# Patient Record
Sex: MALE | Race: WHITE | NOT HISPANIC OR LATINO | Employment: UNEMPLOYED | URBAN - METROPOLITAN AREA
[De-identification: names, ages, dates, MRNs, and addresses within clinical notes are randomized per-mention and may not be internally consistent; named-entity substitution may affect disease eponyms.]

---

## 2017-09-16 ENCOUNTER — HOSPITAL ENCOUNTER (EMERGENCY)
Facility: HOSPITAL | Age: 32
Discharge: HOME | End: 2017-09-16
Attending: EMERGENCY MEDICINE

## 2017-09-16 VITALS
OXYGEN SATURATION: 97 % | RESPIRATION RATE: 14 BRPM | TEMPERATURE: 99 F | HEIGHT: 72 IN | BODY MASS INDEX: 21.67 KG/M2 | DIASTOLIC BLOOD PRESSURE: 65 MMHG | WEIGHT: 160 LBS | HEART RATE: 90 BPM | SYSTOLIC BLOOD PRESSURE: 113 MMHG

## 2017-09-16 DIAGNOSIS — R20.0 NUMBNESS: Primary | ICD-10-CM

## 2017-09-16 DIAGNOSIS — S06.9X9S TBI (TRAUMATIC BRAIN INJURY), WITH LOC OF UNSPECIFIED DURATION, SEQUELA: ICD-10-CM

## 2017-09-16 DIAGNOSIS — R42 DIZZINESS: ICD-10-CM

## 2017-09-16 PROCEDURE — 93005 ELECTROCARDIOGRAM TRACING: CPT

## 2017-09-16 PROCEDURE — 99284 EMERGENCY DEPT VISIT MOD MDM: CPT

## 2017-09-16 PROCEDURE — 93010 ELECTROCARDIOGRAM REPORT: CPT | Mod: ,,, | Performed by: INTERNAL MEDICINE

## 2017-09-16 RX ORDER — MECLIZINE HYDROCHLORIDE CHEWABLE TABLETS 25 MG/1
32 TABLET, CHEWABLE ORAL 3 TIMES DAILY PRN
COMMUNITY

## 2017-09-16 RX ORDER — BUTALBITAL, ACETAMINOPHEN AND CAFFEINE 50; 325; 40 MG/1; MG/1; MG/1
1 TABLET ORAL EVERY 6 HOURS PRN
Qty: 20 TABLET | Refills: 0 | Status: SHIPPED | OUTPATIENT
Start: 2017-09-16 | End: 2017-10-16

## 2017-09-16 NOTE — ED TRIAGE NOTES
Pt had a fall from 15ft approx 1 month ago. Seen at [facility] in [place] with multiple facial fractures, left wrist fracture, and head bleed. Pt states was discharged from hospital 3 weeks ago and was feeling fine and getting better. For the last week, pt c/o worsening right sided facial numbness, right sided tongue numbess, and right sided upper and lower extremitity numbness. Denies any neuro deficits. Denies any other complaints

## 2017-09-16 NOTE — ED PROVIDER NOTES
"Encounter Date: 9/16/2017    SCRIBE #1 NOTE: I, Sonido Salas MIRANDA, am scribing for, and in the presence of,  Mike Davis MD. I have scribed the following portions of the note - Other sections scribed: HPI and ROS.       History     Chief Complaint   Patient presents with    Shortness of Breath     States he was in an accident falling from a ladder a month ago and was fine till a week ago when he started getting dizzy, sob, HA    Dizziness     CC: Numbness     HPI: This 32 y.o. male with MRSA infection and abscess  presents to the ED c/o acute onset, mild (2/10), numbness to the right side of his body with fatigue, ear pain, sinus pressure, SOB, and jaw pain. Pt reports 1.5 months ago he suffered multiple fractures including left wrist and right sided facial fractures along with a brain bleed post 15 foot ladder fall. 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 Pt states he was hospitalized at [facility] and returned to work three days after the incident. Pt states he felt "fine" until this past week when he began to feel " lethargic and drained all the time." No prior tx attempted. No alleviating factors. Pt denies nausea, weakness, and tingling.       The history is provided by the patient. No  was used.     Review of patient's allergies indicates:  No Known Allergies  Past Medical History:   Diagnosis Date    Abscess     MRSA infection      Past Surgical History:   Procedure Laterality Date    ABCESS DRAINAGE       History reviewed. No pertinent family history.  Social History   Substance Use Topics    Smoking status: Current Every Day Smoker     Packs/day: 1.00     Years: 10.00     Types: Cigarettes    Smokeless tobacco: Never Used    Alcohol use No     Review of Systems   Constitutional: Positive for fatigue. Negative for chills, diaphoresis and fever.   HENT: Positive for ear " pain (right sided). Negative for sore throat.         (+) jaw pain   Eyes:        (-) eye problems   Respiratory: Positive for shortness of breath. Negative for cough.    Cardiovascular: Negative for chest pain.   Gastrointestinal: Negative for abdominal pain, diarrhea, nausea and vomiting.   Genitourinary: Negative for dysuria.   Musculoskeletal: Negative for back pain.   Skin: Negative for rash.   Neurological: Positive for numbness (right sided). Negative for headaches.       Physical Exam     Initial Vitals [17 1758]   BP Pulse Resp Temp SpO2   125/72 89 18 98.6 °F (37 °C) 100 %      MAP       89.67         Physical Exam  Nursing note and vitals reviewed.  Constitutional:  Well appearing young male in no obvious distress or discomfort.  HENT:    Head: NC/AT    Eyes: Conjunctivae normal.   (-) scleral icterus.              Mouth/Throat: MMM.      Neck: Neck supple, normal rom.  (-) Posterior cervical TTP.  Cardiovascular: RRR  Pulmonary/Chest: CTAB   Abdominal: Soft. ND/NT   (-) CVA tenderness.  Musculoskeletal: FROM of all major joints. No extremity edema or tenderness.  Neurological: A&Ox3, GCS 15.  Normal speech.  No acute focal motor deficits.     Skin: Skin is warm and dry.   Psychiatric: normal mood and affect.        ED Course   Procedures  Labs Reviewed - No data to display                    Differential diagnosis:   Initial differential diagnosis includes but is not limited to... Traumatic brain injury, concussive syndrome, C-spine fracture and/or subluxation resulting in spinal cord injury, peripheral neuropathy, paresthesias.       Additional Medical Decision Makin-year-old male presents to the emergency dept for evaluation of right-sided paresthesias for the past week following a TBI s/p fall 4 weeks ago - See HPI for details.   On exam, he appears healthy and well obvious distress or discomfort.  GCS 15.  A&O×4 without evidence of clinical intoxication.  No acute focal motor deficits.   CT head and cervical spine without evidence of acute injury including recurrent .  Findings at this time are likely sequelae of recent traumatic brain injury.  He has been strongly advised to follow-up with neurology for further evaluation and management including outpatient MRI.  Return instructions discussed prior to discharge.      Scribe Attestation:   Scribe #1: I performed the above scribed service and the documentation accurately describes the services I performed. I attest to the accuracy of the note.    Attending Attestation:           Physician Attestation for Scribe:  Physician Attestation Statement for Scribe #1: I, Mike Davis MD, reviewed documentation, as scribed by Sonido Salas II in my presence, and it is both accurate and complete.                 ED Course      Clinical Impression:   The primary encounter diagnosis was Numbness. Diagnoses of Dizziness and TBI (traumatic brain injury), with LOC of unspecified duration, sequela were also pertinent to this visit.                           Mike Davis MD  09/16/17 1952

## 2017-09-17 NOTE — ED NOTES
Patient resting on stretcher, respirations even and unlabored, denies any pain at this time, denies any needs at this time. Family at bedside, side rails x2, bed in lowest position, call bell in reach.

## 2021-03-18 ENCOUNTER — HOSPITAL ENCOUNTER (EMERGENCY)
Facility: HOSPITAL | Age: 36
Discharge: HOME | End: 2021-03-18
Attending: EMERGENCY MEDICINE
Payer: MEDICAID

## 2021-03-18 VITALS
HEART RATE: 84 BPM | OXYGEN SATURATION: 100 % | SYSTOLIC BLOOD PRESSURE: 101 MMHG | TEMPERATURE: 99 F | DIASTOLIC BLOOD PRESSURE: 58 MMHG | RESPIRATION RATE: 16 BRPM

## 2021-03-18 DIAGNOSIS — Z20.822 LAB TEST NEGATIVE FOR COVID-19 VIRUS: Primary | ICD-10-CM

## 2021-03-18 LAB
CTP QC/QA: YES
SARS-COV-2 RDRP RESP QL NAA+PROBE: NEGATIVE

## 2021-03-18 PROCEDURE — U0002 COVID-19 LAB TEST NON-CDC: HCPCS | Performed by: EMERGENCY MEDICINE

## 2021-03-18 PROCEDURE — 99282 EMERGENCY DEPT VISIT SF MDM: CPT | Mod: 25

## 2023-08-04 ENCOUNTER — TELEPHONE (OUTPATIENT)
Dept: WOUND CARE | Facility: HOSPITAL | Age: 38
End: 2023-08-04
Payer: MEDICAID

## 2023-08-04 ENCOUNTER — HOSPITAL ENCOUNTER (EMERGENCY)
Facility: HOSPITAL | Age: 38
Discharge: HOME | End: 2023-08-04
Attending: EMERGENCY MEDICINE
Payer: MEDICAID

## 2023-08-04 VITALS
TEMPERATURE: 98 F | OXYGEN SATURATION: 100 % | RESPIRATION RATE: 20 BRPM | BODY MASS INDEX: 25.06 KG/M2 | HEIGHT: 72 IN | DIASTOLIC BLOOD PRESSURE: 73 MMHG | WEIGHT: 185 LBS | SYSTOLIC BLOOD PRESSURE: 140 MMHG | HEART RATE: 87 BPM

## 2023-08-04 DIAGNOSIS — L73.2 HIDRADENITIS SUPPURATIVA: Primary | ICD-10-CM

## 2023-08-04 PROCEDURE — 63600175 PHARM REV CODE 636 W HCPCS: Performed by: PHYSICIAN ASSISTANT

## 2023-08-04 PROCEDURE — 99284 EMERGENCY DEPT VISIT MOD MDM: CPT

## 2023-08-04 PROCEDURE — 96372 THER/PROPH/DIAG INJ SC/IM: CPT | Performed by: PHYSICIAN ASSISTANT

## 2023-08-04 RX ORDER — PREDNISONE 20 MG/1
20 TABLET ORAL 2 TIMES DAILY
Qty: 10 TABLET | Refills: 0 | Status: SHIPPED | OUTPATIENT
Start: 2023-08-04 | End: 2023-08-09

## 2023-08-04 RX ORDER — DEXAMETHASONE SODIUM PHOSPHATE 4 MG/ML
8 INJECTION, SOLUTION INTRA-ARTICULAR; INTRALESIONAL; INTRAMUSCULAR; INTRAVENOUS; SOFT TISSUE
Status: COMPLETED | OUTPATIENT
Start: 2023-08-04 | End: 2023-08-04

## 2023-08-04 RX ORDER — DOXYCYCLINE 100 MG/1
100 CAPSULE ORAL 2 TIMES DAILY
Qty: 20 CAPSULE | Refills: 0 | Status: SHIPPED | OUTPATIENT
Start: 2023-08-04 | End: 2023-08-14

## 2023-08-04 RX ADMIN — DEXAMETHASONE SODIUM PHOSPHATE 8 MG: 4 INJECTION, SOLUTION INTRA-ARTICULAR; INTRALESIONAL; INTRAMUSCULAR; INTRAVENOUS; SOFT TISSUE at 12:08

## 2023-08-04 NOTE — ED NOTES
Pt states would like steroid shot today to get relief prior to leaving ED, Andrea RAMOS informed, tx ordered.

## 2023-08-04 NOTE — DISCHARGE INSTRUCTIONS

## 2023-08-04 NOTE — ED NOTES
Pt presents to ED for painful abscess to skin, noted on pt back, butt, legs, and ears. No drainage noted.

## 2023-08-04 NOTE — ED PROVIDER NOTES
"Encounter Date: 8/4/2023       History     Chief Complaint   Patient presents with    hydradenitis     Pt reports multiple abscess noted to bilateral axilla and buttock x 1 week,      38 year old male presents to ED with concern of hidradenitis flare-up.  Patient reports he has dealt with multiple reoccurring abscesses to bilateral axillary regions,  region and buttock since age of 19.  He has gone through multiple rounds of different antibiotics and steroids.  Does admit he was recently in shelter and has been off of his medications "for a little while" with gradual worsening pain and drainage from multiple abscess sites.  No fevers.  No other acute complaints at this time.    The history is provided by the patient.     Review of patient's allergies indicates:  No Known Allergies  Past Medical History:   Diagnosis Date    Abscess     MRSA infection      Past Surgical History:   Procedure Laterality Date    ABCESS DRAINAGE       No family history on file.  Social History     Tobacco Use    Smoking status: Every Day     Current packs/day: 1.00     Average packs/day: 1 pack/day for 10.0 years (10.0 ttl pk-yrs)     Types: Cigarettes    Smokeless tobacco: Never   Substance Use Topics    Alcohol use: No    Drug use: Yes     Frequency: 3.0 times per week     Types: Marijuana     Review of Systems   Constitutional:  Negative for chills.   Skin:  Positive for wound.       Physical Exam     Initial Vitals [08/04/23 1104]   BP Pulse Resp Temp SpO2   (!) 140/73 87 20 98.2 °F (36.8 °C) 100 %      MAP       --         Physical Exam    Nursing note and vitals reviewed.  Constitutional: Vital signs are normal. He appears well-developed and well-nourished. He is cooperative. He does not have a sickly appearance. He does not appear ill. No distress.   HENT:   Head: Normocephalic and atraumatic.   Eyes: EOM are normal.   Neck:   Normal range of motion.  Musculoskeletal:      Cervical back: Normal range of motion.     Neurological: He " is alert and oriented to person, place, and time. GCS eye subscore is 4. GCS verbal subscore is 5. GCS motor subscore is 6.   Skin:   Numerous abscesses and chronic wounds/scarring noted to bilateral axillary regions,  region and buttock.  Fevers of acute flare-up and drainage.  See images below.   Psychiatric: He has a normal mood and affect. His speech is normal and behavior is normal.                   ED Course   Procedures  Labs Reviewed - No data to display       Imaging Results    None          Medications   dexAMETHasone injection 8 mg (has no administration in time range)     Medical Decision Making:   Initial Assessment:   Patient presents with concern of acute hidradenitis flare-up.  Afebrile.  Numerous chronic wounds and areas of acute drainage noted to bilateral axillary and buttock region.  Differential Diagnosis:   Abscess, hidradenitis  ED Management:  No current clinical findings or current concern for sepsis.  Exam findings appearing consistent with hidradenitis.  Will plan to continue with conservative care.  Given prescription for doxycycline and steroid.  Ambulatory referral sent to both wound care and General surgery.  ED return precautions were discussed.  Patient states his understanding and agrees with plan.    Patient was discussed with and seen by attending, Dr. Verma, who agrees with ED course and dispo.                          Clinical Impression:   Final diagnoses:  [L73.2] Hidradenitis suppurativa (Primary)        ED Disposition Condition    Discharge Stable          ED Prescriptions       Medication Sig Dispense Start Date End Date Auth. Provider    doxycycline (VIBRAMYCIN) 100 MG Cap Take 1 capsule (100 mg total) by mouth 2 (two) times daily. for 10 days 20 capsule 8/4/2023 8/14/2023 Andrea Bowers PA-C    predniSONE (DELTASONE) 20 MG tablet Take 1 tablet (20 mg total) by mouth 2 (two) times daily. for 5 days 10 tablet 8/4/2023 8/9/2023 Andrea Bowers PA-C          Follow-up  Information       Follow up With Specialties Details Why Contact Info Additional Information    [facility]   [address], [address]  [facility] [address] [phone] Please park in [address] or D and use [address]. Take Medical Office Bldg. elevators.             Andrea Bowers PA-C  08/04/23 9349